# Patient Record
Sex: FEMALE | Race: WHITE | NOT HISPANIC OR LATINO | Employment: FULL TIME | URBAN - METROPOLITAN AREA
[De-identification: names, ages, dates, MRNs, and addresses within clinical notes are randomized per-mention and may not be internally consistent; named-entity substitution may affect disease eponyms.]

---

## 2018-07-25 ENCOUNTER — OFFICE VISIT (OUTPATIENT)
Dept: OBSTETRICS AND GYNECOLOGY | Facility: CLINIC | Age: 33
End: 2018-07-25

## 2018-07-25 VITALS
BODY MASS INDEX: 23.04 KG/M2 | DIASTOLIC BLOOD PRESSURE: 80 MMHG | HEART RATE: 95 BPM | SYSTOLIC BLOOD PRESSURE: 121 MMHG | WEIGHT: 174.6 LBS

## 2018-07-25 DIAGNOSIS — Z12.4 PAP SMEAR FOR CERVICAL CANCER SCREENING: ICD-10-CM

## 2018-07-25 DIAGNOSIS — Z01.419 ENCOUNTER FOR GYNECOLOGICAL EXAMINATION: Primary | ICD-10-CM

## 2018-07-25 PROCEDURE — 99395 PREV VISIT EST AGE 18-39: CPT | Performed by: OBSTETRICS & GYNECOLOGY

## 2018-07-25 RX ORDER — HEPATITIS A VACCINE 1440 [IU]/ML
INJECTION, SUSPENSION INTRAMUSCULAR
Refills: 0 | COMMUNITY
Start: 2018-05-02

## 2018-07-25 RX ORDER — DEXTROAMPHETAMINE SACCHARATE, AMPHETAMINE ASPARTATE MONOHYDRATE, DEXTROAMPHETAMINE SULFATE AND AMPHETAMINE SULFATE 7.5; 7.5; 7.5; 7.5 MG/1; MG/1; MG/1; MG/1
30 CAPSULE, EXTENDED RELEASE ORAL DAILY
Refills: 0 | COMMUNITY
Start: 2018-06-27

## 2018-07-25 RX ORDER — BUPROPION HYDROCHLORIDE 300 MG/1
300 TABLET ORAL DAILY
Refills: 0 | COMMUNITY
Start: 2018-06-27

## 2018-07-25 RX ORDER — HYDROXYZINE PAMOATE 25 MG/1
CAPSULE ORAL
Refills: 0 | COMMUNITY
Start: 2018-06-27

## 2018-07-25 RX ORDER — DIAZEPAM 5 MG/1
TABLET ORAL
Refills: 0 | COMMUNITY
Start: 2018-06-27

## 2018-07-25 NOTE — PROGRESS NOTES
Subjective   Mindy Aguilar is a 33 y.o. female here for annual exam.   Last Pap - pt states fall 2016    History of Present Illness   Patient is a 33-year-old that presents for annual gynecological exam.  She has no complaints.  She is having regular cycles and declines contraception.  Patient states she was seen by me 2 years ago and had an abnormal Pap smear followed by colposcopy.  Those records have been requested from [place].  Patient does report a family history of colon cancer, but denies any breast or gynecological cancers.    The following portions of the patient's history were reviewed and updated as appropriate: allergies, current medications, past family history, past medical history, past social history, past surgical history and problem list.    Review of Systems   Genitourinary: Negative for menstrual problem.   All other systems reviewed and are negative.      Objective   Physical Exam  Physical Exam   Constitutional: She appears well-developed and well-nourished.   Cardiovascular: Normal rate and regular rhythm.    Pulmonary/Chest: Effort normal and breath sounds normal. Right breast exhibits no inverted nipple, no mass, no nipple discharge, no skin change and no tenderness. Left breast exhibits no inverted nipple, no mass, no nipple discharge, no skin change and no tenderness.   Abdominal: Soft. She exhibits no distension. There is no tenderness.   Genitourinary: Vagina normal and uterus normal. There is no rash, tenderness, lesion or injury on the right labia. There is no rash, tenderness, lesion or injury on the left labia. Cervix exhibits no motion tenderness, no discharge and no friability. Right adnexum displays no mass, no tenderness and no fullness. Left adnexum displays no mass, no tenderness and no fullness.   Neurological: She is alert.   Psychiatric: She has a normal mood and affect.   Vitals reviewed.      Assessment/Plan   Mindy was seen today for gynecologic exam.    Diagnoses and all  orders for this visit:    Encounter for gynecological examination    Pap smear for cervical cancer screening  -     IGP,CtNg,AptimaHPV,rfx16 / 18,45      Pap smear was collected and records from [place] [facility] have been requested.  Patient was counseled on monthly self breast exams for breast health.  She will follow-up in 1 month for annual exam or sooner if needed.

## 2018-07-30 LAB
C TRACH RRNA CVX QL NAA+PROBE: NEGATIVE
CYTOLOGIST CVX/VAG CYTO: NORMAL
CYTOLOGY CVX/VAG DOC THIN PREP: NORMAL
DX ICD CODE: NORMAL
HIV 1 & 2 AB SER-IMP: NORMAL
HPV I/H RISK 4 DNA CVX QL PROBE+SIG AMP: NEGATIVE
N GONORRHOEA RRNA CVX QL NAA+PROBE: NEGATIVE
OTHER STN SPEC: NORMAL
PATH REPORT.FINAL DX SPEC: NORMAL
STAT OF ADQ CVX/VAG CYTO-IMP: NORMAL

## 2019-08-03 ENCOUNTER — APPOINTMENT (OUTPATIENT)
Dept: CT IMAGING | Facility: HOSPITAL | Age: 34
End: 2019-08-03

## 2019-08-03 ENCOUNTER — HOSPITAL ENCOUNTER (EMERGENCY)
Facility: HOSPITAL | Age: 34
Discharge: HOME | End: 2019-08-03
Attending: EMERGENCY MEDICINE | Admitting: EMERGENCY MEDICINE

## 2019-08-03 VITALS
HEIGHT: 72 IN | SYSTOLIC BLOOD PRESSURE: 134 MMHG | TEMPERATURE: 99.9 F | RESPIRATION RATE: 16 BRPM | WEIGHT: 175 LBS | OXYGEN SATURATION: 99 % | BODY MASS INDEX: 23.7 KG/M2 | DIASTOLIC BLOOD PRESSURE: 96 MMHG | HEART RATE: 96 BPM

## 2019-08-03 DIAGNOSIS — H10.9 BACTERIAL CONJUNCTIVITIS OF LEFT EYE: Primary | ICD-10-CM

## 2019-08-03 LAB
ANION GAP SERPL CALCULATED.3IONS-SCNC: 13.9 MMOL/L (ref 5–15)
BUN BLD-MCNC: 8 MG/DL (ref 6–20)
BUN/CREAT SERPL: 12.5 (ref 7–25)
CALCIUM SPEC-SCNC: 9.1 MG/DL (ref 8.6–10.5)
CHLORIDE SERPL-SCNC: 101 MMOL/L (ref 98–107)
CO2 SERPL-SCNC: 24.1 MMOL/L (ref 22–29)
CREAT BLD-MCNC: 0.64 MG/DL (ref 0.57–1)
GFR SERPL CREATININE-BSD FRML MDRD: 107 ML/MIN/1.73
GLUCOSE BLD-MCNC: 96 MG/DL (ref 65–99)
POTASSIUM BLD-SCNC: 4.1 MMOL/L (ref 3.5–5.2)
SODIUM BLD-SCNC: 139 MMOL/L (ref 136–145)

## 2019-08-03 PROCEDURE — 70481 CT ORBIT/EAR/FOSSA W/DYE: CPT

## 2019-08-03 PROCEDURE — 80048 BASIC METABOLIC PNL TOTAL CA: CPT | Performed by: EMERGENCY MEDICINE

## 2019-08-03 PROCEDURE — 25010000002 IOPAMIDOL 61 % SOLUTION: Performed by: EMERGENCY MEDICINE

## 2019-08-03 PROCEDURE — 99283 EMERGENCY DEPT VISIT LOW MDM: CPT

## 2019-08-03 RX ORDER — OFLOXACIN 3 MG/ML
2 SOLUTION/ DROPS OPHTHALMIC 4 TIMES DAILY
Status: DISCONTINUED | OUTPATIENT
Start: 2019-08-03 | End: 2019-08-04 | Stop reason: HOSPADM

## 2019-08-03 RX ORDER — SODIUM CHLORIDE 0.9 % (FLUSH) 0.9 %
10 SYRINGE (ML) INJECTION AS NEEDED
Status: DISCONTINUED | OUTPATIENT
Start: 2019-08-03 | End: 2019-08-04 | Stop reason: HOSPADM

## 2019-08-03 RX ORDER — TETRACAINE HYDROCHLORIDE 5 MG/ML
2 SOLUTION OPHTHALMIC ONCE
Status: DISCONTINUED | OUTPATIENT
Start: 2019-08-03 | End: 2019-08-04 | Stop reason: HOSPADM

## 2019-08-03 RX ADMIN — OFLOXACIN 2 DROP: 3 SOLUTION/ DROPS OPHTHALMIC at 22:20

## 2019-08-03 RX ADMIN — IOPAMIDOL 75 ML: 612 INJECTION, SOLUTION INTRAVENOUS at 21:38

## 2019-08-03 NOTE — ED PROVIDER NOTES
EMERGENCY DEPARTMENT ENCOUNTER    Room Number:  40/40  Date seen:  8/3/2019  Time seen: 7:35 PM  PCP: Corin Resendiz MD  Historian: Patient; Patient's boyfriend      HPI:  Chief Complaint: Eye pain  Context: Mindy Aguilar is a 34 y.o. female who presents to the ED c/o constant left eye pain that began this morning around 0400. Pt reports the pain has worsened throughout the day, and she has increased redness and swelling to the area. She reports associated left eye discharge. She denies visual changes. She reports she was seen at the [facility] earlier today, and was diagnosed with conjunctivitis. Pt reports she was given eye drops. Pt reports she wears contact lenses, and she denies known trauma or injury to the area.    Pain Location: left eye  Radiation: none  Quality: pain  Intensity/Severity: moderate  Duration: 0400 this morning  Onset quality: gradual  Timing: constant  Progression: worsened  Aggravating Factors: none  Alleviating Factors: none  Previous Episodes: no  Treatment before arrival: Pt had been using eye drops given by the [facility] earlier today.  Associated Symptoms: redness, swelling, eye discharge    PAST MEDICAL HISTORY  Active Ambulatory Problems     Diagnosis Date Noted   • No Active Ambulatory Problems     Resolved Ambulatory Problems     Diagnosis Date Noted   • No Resolved Ambulatory Problems     Past Medical History:   Diagnosis Date   • ADHD    • Seasonal allergies          PAST SURGICAL HISTORY  History reviewed. No pertinent surgical history.      FAMILY HISTORY  Family History   Problem Relation Age of Onset   • No Known Problems Mother    • No Known Problems Father    • No Known Problems Sister    • Colon cancer Paternal Grandmother    • Colon cancer Paternal Uncle          SOCIAL HISTORY  Social History     Socioeconomic History   • Marital status: Single     Spouse name: Not on file   • Number of children: Not on file   • Years of education: Not on file   • Highest  education level: Not on file   Tobacco Use   • Smoking status: Never Smoker   • Smokeless tobacco: Never Used   Substance and Sexual Activity   • Alcohol use: Yes     Comment: SOC   • Drug use: No         ALLERGIES  Patient has no known allergies.        REVIEW OF SYSTEMS  Review of Systems   Constitutional: Negative for fever.   HENT: Negative for sore throat.    Eyes: Positive for pain, discharge and redness. Negative for visual disturbance.   Respiratory: Negative for shortness of breath.    Cardiovascular: Negative for chest pain.   Gastrointestinal: Negative for abdominal pain.   Endocrine: Negative for polyuria.   Genitourinary: Negative for dysuria.   Musculoskeletal: Negative for neck pain.   Skin: Negative for rash.   Neurological: Negative for headaches.   All other systems reviewed and are negative.           PHYSICAL EXAM  ED Triage Vitals   Temp Heart Rate Resp BP SpO2   08/03/19 1914 08/03/19 1914 08/03/19 1914 08/03/19 1920 08/03/19 1914   99.9 °F (37.7 °C) 112 18 134/96 99 %      Temp src Heart Rate Source Patient Position BP Location FiO2 (%)   -- -- -- -- --                GENERAL: not distressed  HENT: nares patent  EYES: no scleral icterus; visual acuity is 20/20 OU; diffuse conjunctival injection with chemosis and eyelid edema.  No fluorescein uptake.  Minimal pain with extraocular movement.  CV: regular rhythm, regular rate  RESPIRATORY: normal effort  MUSCULOSKELETAL: no deformity  NEURO: alert, moves all extremities, follows commands  SKIN: warm, dry    Vital signs and nursing notes reviewed.          LAB RESULTS  Recent Results (from the past 24 hour(s))   Basic Metabolic Panel    Collection Time: 08/03/19  8:58 PM   Result Value Ref Range    Glucose 96 65 - 99 mg/dL    BUN 8 6 - 20 mg/dL    Creatinine 0.64 0.57 - 1.00 mg/dL    Sodium 139 136 - 145 mmol/L    Potassium 4.1 3.5 - 5.2 mmol/L    Chloride 101 98 - 107 mmol/L    CO2 24.1 22.0 - 29.0 mmol/L    Calcium 9.1 8.6 - 10.5 mg/dL    eGFR  Non  Amer 107 >60 mL/min/1.73    BUN/Creatinine Ratio 12.5 7.0 - 25.0    Anion Gap 13.9 5.0 - 15.0 mmol/L       Ordered the above labs and reviewed the results.        RADIOLOGY  CT Orbits With Contrast    (Results Pending)            PROCEDURES  Procedures        MEDICATIONS GIVEN IN ER  Medications   fluorescein ophthalmic strip 1 strip (1 strip Both Eyes Not Given 8/3/19 2157)   tetracaine (ALTACAINE) 0.5 % ophthalmic solution 2 drop (2 drops Left Eye Not Given 8/3/19 2157)   sodium chloride 0.9 % flush 10 mL (not administered)   ofloxacin (OCUFLOX) 0.3 % ophthalmic solution 2 drop (not administered)   iopamidol (ISOVUE-300) 61 % injection 100 mL (75 mL Intravenous Given by Other 8/3/19 2138)         PROGRESS AND CONSULTS  ED Course as of Aug 03 2218   Sat Aug 03, 2019   2044 Differential includes bacterial versus viral conjunctivitis, corneal abrasion, corneal ulcer, scleritis, episcleritis, orbital cellulitis, preseptal cellulitis.  [TD]      ED Course User Index  [TD] Jax Rubio II, MD           MEDICAL DECISION MAKING      MDM  Number of Diagnoses or Management Options  Bacterial conjunctivitis of left eye:   Diagnosis management comments: I discussed the case with Dr. Torres.  She does not see any evidence of orbital sialitis.  She states that it is all preseptal.  Patient has normal vision.  She has no floor seen uptake on her cornea.  I see no evidence of keratitis or corneal ulcer.  I am going to have her follow-up with ophthalmology on Monday.  I gave her very clear return precautions, including any signs of fever, vision change, or uncontrolled pain.  She is to be going home with ofloxacin taking it 4 times a day for 1 week.  She got her first dose in the emergency department and will use the bottle from the emergency department for home.       Amount and/or Complexity of Data Reviewed  Clinical lab tests: reviewed and ordered  Tests in the radiology section of CPT®: ordered and  reviewed  Discussion of test results with the performing providers: yes (Dr. Torres)  Independent visualization of images, tracings, or specimens: yes (No orbital cellulitis)               DIAGNOSIS  Final diagnoses:   Bacterial conjunctivitis of left eye         DISPOSITION  Discharge            Latest Documented Vital Signs:  As of 10:18 PM  BP- 134/96 HR- 96 Temp- 99.9 °F (37.7 °C) O2 sat- 99%        --  Documentation assistance provided by rahul Bey for Dr. Ramiro MD.  Information recorded by the scribe was done at my direction and has been verified and validated by me.                       Shirley Bey  08/03/19 2032       Jax Rubio II, MD  08/03/19 [id number]

## 2019-08-03 NOTE — ED NOTES
Pt states that she woke this am with left eye pain. Pt was seen at [facility] and diagnosed with conjunctivitis and given drops. Pt has swelling and white drainage from left eye. Pt reports recent illness.      Marah Finley RN  08/03/19 1919